# Patient Record
Sex: FEMALE | Race: WHITE | HISPANIC OR LATINO | Employment: FULL TIME | URBAN - METROPOLITAN AREA
[De-identification: names, ages, dates, MRNs, and addresses within clinical notes are randomized per-mention and may not be internally consistent; named-entity substitution may affect disease eponyms.]

---

## 2017-10-17 ENCOUNTER — APPOINTMENT (OUTPATIENT)
Dept: RADIOLOGY | Facility: IMAGING CENTER | Age: 57
End: 2017-10-17
Attending: PREVENTIVE MEDICINE
Payer: COMMERCIAL

## 2017-10-17 ENCOUNTER — OCCUPATIONAL MEDICINE (OUTPATIENT)
Dept: OCCUPATIONAL MEDICINE | Facility: CLINIC | Age: 57
End: 2017-10-17
Payer: COMMERCIAL

## 2017-10-17 VITALS
TEMPERATURE: 97.3 F | OXYGEN SATURATION: 98 % | HEIGHT: 59 IN | RESPIRATION RATE: 16 BRPM | HEART RATE: 68 BPM | BODY MASS INDEX: 34.27 KG/M2 | SYSTOLIC BLOOD PRESSURE: 130 MMHG | DIASTOLIC BLOOD PRESSURE: 82 MMHG | WEIGHT: 170 LBS

## 2017-10-17 DIAGNOSIS — S80.02XD CONTUSION OF LEFT KNEE, SUBSEQUENT ENCOUNTER: ICD-10-CM

## 2017-10-17 PROCEDURE — 73564 X-RAY EXAM KNEE 4 OR MORE: CPT | Mod: TC,LT | Performed by: PHYSICIAN ASSISTANT

## 2017-10-17 PROCEDURE — 99203 OFFICE O/P NEW LOW 30 MIN: CPT | Performed by: PREVENTIVE MEDICINE

## 2017-10-17 NOTE — LETTER
[facility]  [address],   [address] [place] [address]  Phone:  [phone] - Fax:  [phone]   [facility] Progress Report and Disability Certification  Date of Service: 10/17/2017   No Show:  No  Date / Time of Next Visit: 10/23/2017@4:20 PM   Claim Information   Patient Name: Jayde Sierra  Claim Number:     Employer: [employer]  Date of Injury: 10/14/2017     Insurer / TPA: [employer]  ID / SSN:     Occupation: [employer]  Diagnosis: The encounter diagnosis was Contusion of left knee, subsequent encounter.    Medical Information   Related to Industrial Injury? Yes    Subjective Complaints:  DOI 10/14/2017. Mechanism of injury-fall from second step stepladder. 57-year-old worker who is here for evaluation of left knee pain. This developed after she fell and he had twisted her knee. She complains mostly of mild lateral knee pain with radiation to the hip and ankle. She had some swelling which is improved. She  is using ibuprofen with good pain relief. She is tolerating most of her regular work duties.   Objective Findings: Appearance: Well-developed, well-nourished.   Mental Status: Mood and Affect normal. Pleasant. Cooperative. Appropriate.   ENT: Oropharynx clear. Moist mucous membranes. Hearing normal.   Eyes: Pupils reactive. Conjunctiva normal. No scleral icterus.   Neck: Trachea Midline. No thyromegaly. No masses.  Cardiovascular: Normal rate. Regular rhythm. Normal heart sounds.   Chest: Effort normal. Breath sounds clear.   Skin: Skin is warm and dry. No rash.   Musculoskeletal: Left knee exam shows no swelling, ecchymosis, heat. Mild tenderness. Negative varus and valgus stress testing negative. No ankle or foot tenderness.     Pre-Existing Condition(s):     Assessment:   Initial Visit    Status: Additional Care Required  Permanent Disability:No    Plan:      Diagnostics:      Comments:       Disability Information   Status: Released to Full  Duty    From:  10/17/2017  Through: 10/23/2017 Restrictions are:     Physical Restrictions   Sitting:    Standing:    Stooping:    Bending:      Squatting:    Walking:    Climbing:    Pushing:      Pulling:    Other:    Reaching Above Shoulder (L):   Reaching Above Shoulder (R):       Reaching Below Shoulder (L):    Reaching Below Shoulder (R):      Not to exceed Weight Limits   Carrying(hrs):   Weight Limit(lb):   Lifting(hrs):   Weight  Limit(lb):     Comments:      Repetitive Actions   Hands: i.e. Fine Manipulations from Grasping:     Feet: i.e. Operating Foot Controls:     Driving / Operate Machinery:     Physician Name: Beck Adhikari M.D. Physician Signature: BECK Ortez M.D. e-Signature:  , Medical Director   Clinic Name / Location: [address],   [address] [address] Clinic Phone Number: Dept: [phone]   Appointment Time: 3:20 Pm Visit Start Time: 12:27 PM   Check-In Time:  12:24 Pm Visit Discharge Time:  1:54 PM   Original-Treating Physician or Chiropractor    Page 2-Insurer/TPA    Page 3-Employer    Page 4-Employee

## 2017-10-17 NOTE — PROGRESS NOTES
"Subjective:      Jayde Sierra is a 57 y.o. female who presents with Other (NEW WC DOI 10/14/17 Left knee & foot slipped from ladder)      DOI 10/14/2017. Mechanism of injury-fall from second step stepladder. 57-year-old worker who is here for evaluation of left knee pain. This developed after she fell and he had twisted her knee. She complains mostly of mild lateral knee pain with radiation to the hip and ankle. She had some swelling which is improved. She  is using ibuprofen with good pain relief. She is tolerating most of her regular work duties.     HPI    ROS  Comprehensive medical history form reviewed. Pertinent positives and negatives included in HPI.    PFSH: reviewed in Epic    PMH:  has no past medical history on file.  MEDS: No current outpatient prescriptions on file.  ALLERGIES: Not on File  SURGHX: No past surgical history on file.  SOCHX:    Work Status: Works at [employer]  FH: No pertinent hereditary disorders.        Objective:     /82   Pulse 68   Temp 36.3 °C (97.3 °F)   Resp 16   Ht 1.499 m (4' 11\")   Wt 77.1 kg (170 lb)   SpO2 98%   BMI 34.34 kg/m²      Physical Exam    Appearance: Well-developed, well-nourished.   Mental Status: Mood and Affect normal. Pleasant. Cooperative. Appropriate.   ENT: Oropharynx clear. Moist mucous membranes. Hearing normal.   Eyes: Pupils reactive. Conjunctiva normal. No scleral icterus.   Neck: Trachea Midline. No thyromegaly. No masses.  Cardiovascular: Normal rate. Regular rhythm. Normal heart sounds.   Chest: Effort normal. Breath sounds clear.   Skin: Skin is warm and dry. No rash.   Musculoskeletal: Left knee exam shows no swelling, ecchymosis, heat. Mild tenderness. Negative varus and valgus stress testing negative. No ankle or foot tenderness.  Left knee x-ray show no acute bony abnormalities       Assessment/Plan:     1. Contusion of left knee, subsequent encounter  New to Occupational Health   OTC NSAIDs  Ice and heat  May remain at regular " work  Recheck in one week or sooner if needed

## 2017-10-17 NOTE — LETTER
"EMPLOYEE’S CLAIM FOR COMPENSATION/ REPORT OF INITIAL TREATMENT  FORM C-4    EMPLOYEE’S CLAIM - PROVIDE ALL INFORMATION REQUESTED   First Name  Jayde Last Name  Mariela Birthdate                    1960                Sex  female Claim Number   Home Address  [address] Age  57 y.o. Height  1.499 m (4' 11\") Weight  77.1 kg (170 lb) [facility]  [address] Telephone  [phone] (home)    Mailing Address  [address] [facility]  [address] Primary Language Spoken  English    Insurer  unknown Third Party   [employer]   Employee's Occupation (Job Title) When Injury or Occupational Disease Occurred  [employer]    Employer's Name  [employer]  Telephone  [phone]    Employer Address  [address]  [address]    Date of Injury  10/14/2017               Hour of Injury  5:45 PM Date Employer Notified  10/15/2017 Last Day of Work after Injury or Occupational Disease  10/16/2017 Supervisor to Whom Injury Reported  Mary Ann Partida   Address or Location of Accident (if applicable)  [[address] [address]]   What were you doing at the time of accident? (if applicable)  Went to step down from stepladder slipped and fell    How did this injury or occupational disease occur? (Be specific an answer in detail. Use additional sheet if necessary)  Went to step down from step ladder and slipped and fell and hurt knee   If you believe that you have an occupational disease, when did you first have knowledge of the disability and it relationship to your employment?  na Witnesses to the Accident  Miroslava Conway      Nature of Injury or Occupational Disease  Strain  Part(s) of Body Injured or Affected  Knee (L), Lower Leg (L), Ankle (L)    I certify that the above is true and correct to the best of my knowledge and that I have provided this " information in order to obtain the benefits of [place]  I hereby authorize any physician, chiropractor, surgeon, practitioner, or other person, any hospital, including [facility] hospital, any medical service organization, any insurance company, or other institution or organization to release to each other, any medical or other information, including benefits paid or payable, pertinent to this injury or disease, except information relative to diagnosis, treatment and/or counseling for AIDS, psychological conditions, alcohol or controlled substances, for which I must give specific authorization.  A Photostat of this authorization shall be as valid as the original.     Date   Place   Employee’s Signature   THIS REPORT MUST BE COMPLETED AND MAILED WITHIN 3 WORKING DAYS OF TREATMENT   Place  [facility]  Name of Facility  [facility]   Date  10/17/2017 Diagnosis  (S80.02XD) Contusion of left knee, subsequent encounter Is there evidence the injured employee was under the influence of alcohol and/or another controlled substance at the time of accident?   Hour  12:27 PM Description of Injury or Disease  The encounter diagnosis was Contusion of left knee, subsequent encounter. No   Treatment  Left knee contusion - ice, OTC NSAID, conservative observation, activity as tolerated  Have you advised the patient to remain off work five days or more? No   X-Ray Findings  Negative   If Yes   From Date  To Date      From information given by the employee, together with medical evidence, can you directly connect this injury or occupational disease as job incurred?  Yes If No Full Duty  Yes Modified Duty      Is additional medical care by a physician indicated?  Yes If Modified Duty, Specify any Limitations / Restrictions      Do you know of any previous injury or disease contributing  "to this condition or occupational disease?                            No   Date  10/17/2017 Print Doctor’s Name Beck Adhikari M.D. I certify the employer’s copy of  this form was mailed on:   Address  [address]   [facility] [address] Insurer’s Use Only     [facility] [place]  [address]    Provider’s Tax ID Number  [id number] Telephone  Dept: [phone]        e-BECK Montano M.D.   e-Signature:  , Medical Director Degree  MD BURNETT-TREATING PHYSICIAN OR CHIROPRACTOR    PAGE 2-INSURER/TPA    PAGE 3-EMPLOYER    PAGE 4-EMPLOYEE             Form C-4 (rev10/07)              BRIEF DESCRIPTION OF RIGHTS AND BENEFITS  (Pursuant to [place] 616C.050)    Notice of Injury or Occupational Disease (Incident Report Form C-1): If an injury or occupational disease (OD) arises out of and in the  course of employment, you must provide written notice to your employer as soon as practicable, but no later than 7 days after the accident or  OD. Your employer shall maintain a sufficient supply of the required forms.    Claim for Compensation (Form C-4): If medical treatment is sought, the form C-4 is available at the place of initial treatment. A completed  \"Claim for Compensation\" (Form C-4) must be filed within 90 days after an accident or OD. The treating physician or chiropractor must,  within 3 working days after treatment, complete and mail to the employer, the employer's insurer and third-party , the Claim for  Compensation.    Medical Treatment: If you require medical treatment for your on-the-job injury or OD, you may be required to select a physician or  chiropractor from a list provided by your workers’ compensation insurer, if it has contracted with an Organization for Managed Care (MCO) or  Preferred Provider Organization (PPO) or providers of health care. If your employer has not entered into a contract with an MCO or PPO, you  may select a physician or chiropractor from the Panel of " Physicians and Chiropractors. Any medical costs related to your industrial injury or  OD will be paid by your insurer.    Temporary Total Disability (TTD): If your doctor has certified that you are unable to work for a period of at least 5 consecutive days, or 5  cumulative days in a 20-day period, or places restrictions on you that your employer does not accommodate, you may be entitled to TTD  compensation.    Temporary Partial Disability (TPD): If the wage you receive upon reemployment is less than the compensation for TTD to which you are  entitled, the insurer may be required to pay you TPD compensation to make up the difference. TPD can only be paid for a maximum of 24  months.    Permanent Partial Disability (PPD): When your medical condition is stable and there is an indication of a PPD as a result of your injury or  OD, within 30 days, your insurer must arrange for an evaluation by a rating physician or chiropractor to determine the degree of your PPD. The  amount of your PPD award depends on the date of injury, the results of the PPD evaluation and your age and wage.    Permanent Total Disability (PTD): If you are medically certified by a treating physician or chiropractor as permanently and totally disabled  and have been granted a PTD status by your insurer, you are entitled to receive monthly benefits not to exceed 66 2/3% of your average  monthly wage. The amount of your PTD payments is subject to reduction if you previously received a PPD award.    Vocational Rehabilitation Services: You may be eligible for vocational rehabilitation services if you are unable to return to the job due to a  permanent physical impairment or permanent restrictions as a result of your injury or occupational disease.    Transportation and Per Anisha Reimbursement: You may be eligible for travel expenses and per anisha associated with medical treatment.    Reopening: You may be able to reopen your claim if your condition  worsens after claim closure.    Appeal Process: If you disagree with a written determination issued by the insurer or the insurer does not respond to your request, you may  appeal to the Department of Administration, , by following the instructions contained in your determination letter. You must  appeal the determination within 70 days from the date of the determination letter at [address], [address], [place], [place]  [address], or [address], [place], [place] [address]. If you disagree with the  decision, you may appeal to the  Department of Administration, . You must file your appeal within 30 days from the date of the  decision  letter at [address], [address], [place], [place] [address], or [address], [place], [place] [address]. If you  disagree with a decision of an , you may file a petition for judicial review with the District Court. You must do so within 30  days of the Appeal Officer’s decision. You may be represented by an  at your own expense or you may contact the [facility] for possible  representation.    [place]  for Injured Workers (NAIW): If you disagree with a  decision, you may request that NAIW represent you  without charge at an  Hearing. For information regarding denial of benefits, you may contact the [facility] at: [address], [address], [place] [address], [phone], or [address], [place] [address], [phone]    To File a Complaint with the Division: If you wish to file a complaint with the  of the Division of Industrial Relations (DIR),  please contact the Workers’ Compensation Section, [address], [address], [place], [place] [address], telephone [phone], or  [address], [place] [address], telephone [phone])  [phone].    For assistance with Workers’ Compensation Issues: you may contact the Office of the Governor Consumer Health Assistance, [address] [place] [address], [address], [place] [address], Toll Free 1-213.511.3410, Web site: http://govcha.[facility].nv., E-mail  [email or web address].nv.                                                                                                                                                                                                                                   __________________________________________________________________                                                                   _________________                Employee Name / Signature                                                                                                                                                       Date                                                                                                                                                                                                     D-2 (rev. 10/07)

## 2017-10-23 ENCOUNTER — OCCUPATIONAL MEDICINE (OUTPATIENT)
Dept: OCCUPATIONAL MEDICINE | Facility: CLINIC | Age: 57
End: 2017-10-23
Payer: COMMERCIAL

## 2017-10-23 VITALS
DIASTOLIC BLOOD PRESSURE: 94 MMHG | TEMPERATURE: 97.8 F | HEIGHT: 59 IN | OXYGEN SATURATION: 96 % | BODY MASS INDEX: 34.27 KG/M2 | HEART RATE: 73 BPM | SYSTOLIC BLOOD PRESSURE: 132 MMHG | RESPIRATION RATE: 16 BRPM | WEIGHT: 170 LBS

## 2017-10-23 DIAGNOSIS — S80.02XD CONTUSION OF LEFT KNEE, SUBSEQUENT ENCOUNTER: ICD-10-CM

## 2017-10-23 PROCEDURE — 99212 OFFICE O/P EST SF 10 MIN: CPT | Performed by: PREVENTIVE MEDICINE

## 2017-10-23 ASSESSMENT — ENCOUNTER SYMPTOMS: CONSTITUTIONAL NEGATIVE: 1

## 2017-10-23 NOTE — PROGRESS NOTES
"Subjective:      Jayde Sierra is a 57 y.o. female who presents with Follow-Up (WC DOI 10/14/2017 - Knee - Better - Room 2)       DOI 10/14/2017. Mechanism of injury-fall from second step stepladder. 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 She reports she is improved. Less pain. She is tolerating regular work      HPI    Review of Systems   Constitutional: Negative.      PFSH:  WORK STATUS: Full duty  PMH:  has no past medical history on file.  MEDS: No current outpatient prescriptions on file.       Objective:     /94   Pulse 73   Temp 36.6 °C (97.8 °F)   Resp 16   Ht 1.499 m (4' 11\")   Wt 77.1 kg (170 lb)   SpO2 96%   BMI 34.34 kg/m²      Physical Exam    Left knee shows no swelling, ecchymosis, heat. Normal gait.       Assessment/Plan:     1. Contusion of left knee, subsequent encounter  Condition improved  Regular work  Release from care      "

## 2017-10-23 NOTE — LETTER
[address],   [place] [address]  Phone:  [phone] - Fax:  [phone]   [facility] Progress Report and Disability Certification  Date of Service: 10/23/2017   No Show:  No  Date / Time of Next Visit:  Discharged    Claim Information   Patient Name: Jayde Sierra  Claim Number:     Employer: [employer]  Date of Injury: 10/14/2017     Insurer / TPA: [employer]  ID / SSN:     Occupation: [employer]  Diagnosis: The encounter diagnosis was Contusion of left knee, subsequent encounter.    Medical Information   Related to Industrial Injury? Yes    Subjective Complaints:   DOI 10/14/2017. Mechanism of injury-fall from second step stepladder. 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 She reports she is improved. Less pain. She is tolerating regular work    Objective Findings: Left knee shows no swelling, ecchymosis, heat. Normal gait.   Pre-Existing Condition(s):     Assessment:   Condition Improved    Status: Discharged /  MMI  Permanent Disability:No    Plan:      Diagnostics:      Comments:       Disability Information   Status: Released to Full Duty    From:  10/23/2017  Through:   Restrictions are: Temporary   Physical Restrictions   Sitting:    Standing:    Stooping:    Bending:      Squatting:    Walking:    Climbing:    Pushing:      Pulling:    Other:    Reaching Above Shoulder (L):   Reaching Above Shoulder (R):       Reaching Below Shoulder (L):    Reaching Below Shoulder (R):      Not to exceed Weight Limits   Carrying(hrs):   Weight Limit(lb):   Lifting(hrs):   Weight  Limit(lb):     Comments:      Repetitive Actions   Hands: i.e. Fine Manipulations from Grasping:     Feet: i.e. Operating Foot Controls:     Driving / Operate Machinery:     Physician Name: Beck Adhikari M.D. Physician Signature: BECK Ortez M.D. e-Signature:  , Medical Director   Clinic Name / Location: [facility] [place] [facility]  Occupational Health [address],   [address]  [place] [address] Clinic Phone Number: Dept: [phone]   Appointment Time: 4:20 Pm Visit Start Time: 4:25 PM   Check-In Time:  4:17 Pm Visit Discharge Time: 4:52 PM   Original-Treating Physician or Chiropractor    Page 2-Insurer/TPA    Page 3-Employer    Page 4-Employee